# Patient Record
Sex: MALE | Race: WHITE
[De-identification: names, ages, dates, MRNs, and addresses within clinical notes are randomized per-mention and may not be internally consistent; named-entity substitution may affect disease eponyms.]

---

## 2019-08-29 ENCOUNTER — HOSPITAL ENCOUNTER (OUTPATIENT)
Dept: HOSPITAL 58 - RAD | Age: 49
Discharge: HOME | End: 2019-08-29
Attending: PHYSICIAN ASSISTANT

## 2019-08-29 VITALS — BODY MASS INDEX: 35.2 KG/M2

## 2019-08-29 DIAGNOSIS — M25.521: Primary | ICD-10-CM

## 2019-08-29 NOTE — DI
EXAM:  Three views of the right elbow. 

  

History:  Right elbow pain. 

  

Findings:  No acute fracture or dislocation.  No joint effusion.  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 

  

Impression: 

1.  No acute osseous abnormality. 

2.  Large olecranon enthesiophyte